# Patient Record
Sex: MALE | Race: AMERICAN INDIAN OR ALASKA NATIVE | HISPANIC OR LATINO | URBAN - METROPOLITAN AREA
[De-identification: names, ages, dates, MRNs, and addresses within clinical notes are randomized per-mention and may not be internally consistent; named-entity substitution may affect disease eponyms.]

---

## 2023-10-20 ENCOUNTER — OFFICE VISIT (OUTPATIENT)
Dept: FAMILY MEDICINE CLINIC | Facility: CLINIC | Age: 33
End: 2023-10-20

## 2023-10-20 VITALS
WEIGHT: 226 LBS | HEART RATE: 80 BPM | SYSTOLIC BLOOD PRESSURE: 120 MMHG | OXYGEN SATURATION: 95 % | DIASTOLIC BLOOD PRESSURE: 70 MMHG | RESPIRATION RATE: 16 BRPM | TEMPERATURE: 98.3 F

## 2023-10-20 DIAGNOSIS — Z23 ENCOUNTER FOR IMMUNIZATION: ICD-10-CM

## 2023-10-20 DIAGNOSIS — J30.2 SEASONAL ALLERGIES: Primary | ICD-10-CM

## 2023-10-20 DIAGNOSIS — E66.9 OBESITY (BMI 30.0-34.9): ICD-10-CM

## 2023-10-20 RX ORDER — FLUTICASONE PROPIONATE 50 MCG
1 SPRAY, SUSPENSION (ML) NASAL DAILY
Qty: 15.8 ML | Refills: 1 | Status: SHIPPED | OUTPATIENT
Start: 2023-10-20

## 2023-10-20 RX ORDER — CETIRIZINE HYDROCHLORIDE 10 MG/1
10 TABLET ORAL DAILY
Qty: 30 TABLET | Refills: 0 | Status: SHIPPED | OUTPATIENT
Start: 2023-10-20

## 2023-10-20 NOTE — ASSESSMENT & PLAN NOTE
103 kg (226 lb)  Patient report no family history of diabetes,hyperlipidemia, or essential hypertension. Patient reports a diet high in carbohydrates due to eating a lot of rice and tortillas. Patient denies lipid screening, A1c screening at this time. Will address need for screen at next visit. Patient educated on importance of low carbohydrate diet and advised daily exercise.      -F/U in 6 months for well visit

## 2023-10-20 NOTE — PROGRESS NOTES
Name: Sanaz Fung      : 1990      MRN: [id number]  Encounter Provider: Linda Waterman MD  Encounter Date: 10/20/2023   Encounter department: [address]     1. Seasonal allergies  Assessment & Plan:  3 month history of nasal congestion not a/w rhinorrhea, cough, itchy eyes. Physical exam positive for swollen bl nasal turbinates    -zyrtec 10 mg qd  -flonase       2. Obesity (BMI 30.0-34. 9)  Assessment & Plan:  103 kg (226 lb)  Patient report no family history of diabetes,hyperlipidemia, or essential hypertension. Patient reports a diet high in carbohydrates due to eating a lot of rice and tortillas. Patient denies lipid screening, A1c screening at this time. Will address need for screen at next visit. Patient educated on importance of low carbohydrate diet and advised daily exercise. -F/U in 6 months for well visit      3. Encounter for immunization        Depression Screening and Follow-up Plan: Patient was screened for depression during today's encounter. They screened negative with a PHQ-2 score of 0. Subjective      27 yo male with no known prior medical history presents to clinic today to establish care. Patient reports he has had nasal congestion for the past 3 months with no other symptoms. He denies prioe history of asthma, eczema or atopy. Patient denies rhinorrhea, dysphagia, sob, headache, ear fullness, chest pain, n/v, diarrhea and constipation. Patient denies recent travel, exposure to sick contacts, fever, chills, diaphoresis, myalgias, arthralgias     Review of Systems   Constitutional:  Negative for chills and fever. HENT:  Negative for ear pain and sore throat. Eyes:  Negative for pain and visual disturbance. Respiratory:  Negative for cough and shortness of breath. Cardiovascular:  Negative for chest pain and palpitations.    Gastrointestinal:  Negative for abdominal pain and vomiting. Genitourinary:  Negative for dysuria and hematuria. Musculoskeletal:  Negative for arthralgias and back pain. Skin:  Negative for color change and rash. Neurological:  Negative for seizures and syncope. All other systems reviewed and are negative. No current outpatient medications on file prior to visit. Objective     /70 (BP Location: Right arm, Patient Position: Sitting, Cuff Size: Standard)   Pulse 80   Temp 98.3 °F (36.8 °C) (Temporal)   Resp 16   Wt 103 kg (226 lb)   SpO2 95%     Physical Exam  Vitals reviewed. HENT:      Head: Normocephalic. Nose: Nose normal.   Eyes:      Conjunctiva/sclera: Conjunctivae normal.   Cardiovascular:      Rate and Rhythm: Normal rate and regular rhythm. Pulses: Normal pulses. Pulmonary:      Effort: Pulmonary effort is normal.   Abdominal:      General: Bowel sounds are normal.   Musculoskeletal:      Cervical back: Normal range of motion. Skin:     General: Skin is warm. Capillary Refill: Capillary refill takes less than 2 seconds. Neurological:      General: No focal deficit present. Mental Status: He is alert.    Psychiatric:         Behavior: Behavior normal.     Nicol Landers MD

## 2023-10-20 NOTE — ASSESSMENT & PLAN NOTE
3 month history of nasal congestion not a/w rhinorrhea, cough, itchy eyes.   Physical exam positive for swollen bl nasal turbinates    -zyrtec 10 mg qd  -flonase